# Patient Record
Sex: MALE | Race: OTHER
[De-identification: names, ages, dates, MRNs, and addresses within clinical notes are randomized per-mention and may not be internally consistent; named-entity substitution may affect disease eponyms.]

---

## 2020-10-18 ENCOUNTER — HOSPITAL ENCOUNTER (EMERGENCY)
Dept: HOSPITAL 72 - EMR | Age: 50
Discharge: HOME | End: 2020-10-18
Payer: SELF-PAY

## 2020-10-18 VITALS — BODY MASS INDEX: 2.82 KG/M2 | WEIGHT: 18 LBS | HEIGHT: 67 IN

## 2020-10-18 VITALS — DIASTOLIC BLOOD PRESSURE: 82 MMHG | SYSTOLIC BLOOD PRESSURE: 149 MMHG

## 2020-10-18 VITALS — DIASTOLIC BLOOD PRESSURE: 81 MMHG | SYSTOLIC BLOOD PRESSURE: 137 MMHG

## 2020-10-18 DIAGNOSIS — H66.014: Primary | ICD-10-CM

## 2020-10-18 DIAGNOSIS — R42: ICD-10-CM

## 2020-10-18 DIAGNOSIS — E11.9: ICD-10-CM

## 2020-10-18 DIAGNOSIS — I10: ICD-10-CM

## 2020-10-18 DIAGNOSIS — F17.200: ICD-10-CM

## 2020-10-18 PROCEDURE — 99282 EMERGENCY DEPT VISIT SF MDM: CPT

## 2020-10-18 NOTE — EMERGENCY ROOM REPORT
History of Present Illness


General


Chief Complaint:  Dizziness


Source:  Patient





Present Illness


HPI


The patient presents with right ear pain.  Today he also started having 

discharge that had a foul odor.  He denies fevers or chills.  In addition today 

he was complaining about dizziness while driving but was able to manage.  He 

felt mildly vertiginous without nausea.  The patient had some decrease in his 

hearing earlier today.  The pain is rated 7/10 and aching.  He denies sore 

throat or cough.





Several months ago and [place] he was [place] by  And told that he had

possibly mycologic process in his right ear.  No treatment was recommended at 

that time.  There was no drainage.





The patient is diabetic.  Accucheck 10 d ago = 120.





No sore throat, chest pain, palpitations, vomiting, diarrhea, dysuria, abdominal

pain, shortness of breath, joint pain, rashes, depression, anxiety, visual 

changes, dizziness, headache.


Allergies:  


Coded Allergies:  


     No Known Allergies (Unverified , 10/18/20)





COVID-19 Screening


Contact w/high risk pt:  No


Experienced COVID-19 symptoms?:  No


COVID-19 Testing performed PTA:  No





Patient History


Past Medical History:  see triage record


Social History:  Reports: smoking


Social History Narrative


Works at a desk job


Reviewed Nursing Documentation:  PMH: Agreed; PSxH: Agreed





Nursing Documentation-PMH


Hx Hypertension:  Yes


Hx Diabetes:  Yes





Review of Systems


All Other Systems:  negative except mentioned in HPI





Physical Exam





Vital Signs








  Date Time  Temp Pulse Resp B/P (MAP) Pulse Ox O2 Delivery O2 Flow Rate FiO2


 


10/18/20 19:58 98.2 86 48 152/82 (105) 98 Room Air  








Sp02 EP Interpretation:  reviewed, normal


General Appearance:  well appearing, no apparent distress, GCS 15


Head:  normocephalic


Eyes:  bilateral eye normal inspection, bilateral eye PERRL


ENT:  normal pharynx, other - Right TM with perforation and some drainage with 

inflammation without erythema of the tympanic membrane.  Canal normal and no 

pinna tenderness.  Left TM normal


Neck:  full range of motion, supple


Respiratory:  normal inspection


Cardiovascular #1:  regular rate, rhythm


Cardiovascular #2:  2+ radial (R)


Gastrointestinal:  normal inspection


Musculoskeletal:  gait/station normal


Neurologic:  alert, grossly normal


Psychiatric:  mood/affect normal


Skin:  no rash, warm/dry





Medical Decision Making


Diagnostic Impression:  


   Primary Impression:  


   Otitis media


   Qualified Codes:  H66.014 - Acute suppurative otitis media with spontaneous 

   rupture of ear drum, recurrent, right ear


   Additional Impression:  


   Vertigo


ER Course


Patient presents with discharge from right ear, decreased hearing without fever.

 Based on exam the patient has a perforated tympanic membrane that appears 

infected at this time.  He has increased risk factor of diabetes however it 

appears that his glycemic control is good.  Concern for viral versus bacterial 

versus micro logic process considered.  Based on exam otitis media of bacterial 

source with perforation is most likely.  Antibiotics indicated.  Also has 

vertiginous.  He states he is able to manage driving.  Ciprofloxacin indicated. 

Patient also given Tylenol for pain.





Discussed treatment plan with patient.  Discussed the need for outpatient 

evaluation by ear nose and throat specialty.





Patient improved and stable for outpatient observation and treatment.





Last Vital Signs








  Date Time  Temp Pulse Resp B/P (MAP) Pulse Ox O2 Delivery O2 Flow Rate FiO2


 


10/18/20 20:46 98.8       


 


10/18/20 20:46  76 16 137/81 100 Room Air  








Status:  improved


Disposition:  HOME, SELF-CARE


Condition:  Improved


Scripts


Meclizine Hcl* (MECLIZINE*) 25 Mg Tablet


25 MG ORAL THREE TIMES A DAY, #10 TAB


   Prov: Rajesh Rousseau MD         10/18/20 


Neomycin/Polymyxin B Sulf/Hc (NEOMYCIN-POLYMYXIN-HC EAR SUSP) 10 Ml Drops.susp


3 DROP OT TID for 7 Days, #10 ML


   Prov: Rajesh Rousseau MD         10/18/20 


Ciprofloxacin Hcl* (CIPROFLOXACIN HCL*) 500 Mg Tablet


500 MG ORAL Q12H, #14 TAB 0 Refills


   Prov: Rajesh Rousseau MD         10/18/20


Referrals:  


NOT CHOSEN IPA/MD,REFERRING (PCP)











Rajesh Rousseau MD                Oct 18, 2020 20:21

## 2020-10-18 NOTE — NUR
ER DISCHARGE NOTE:

Patient is cleared to be discharged per ERMD, pt is aox4, on room air, with 
stable vital signs. pt was given dc paperwork, work note, and paper 
prescriptions with instructions to f/u with PMD and ENT specialist as soon as 
possible. pt was able to verbalize understanding, pt id band removed. pt is 
able to ambulate with steady gait. pt took all belongings.

## 2020-10-18 NOTE — NUR
ED Nurse Note: pt c/o right ear pain onset today at 2pm with secretions, pt 
reports also feeling dizzy from the ear pain. Pt denies traumatic injury to 
ear. No nausea, vomiting, fever or chills. Pt AAOx4, vital signs stable as 
documented.

## 2020-11-10 ENCOUNTER — HOSPITAL ENCOUNTER (EMERGENCY)
Dept: HOSPITAL 72 - EMR | Age: 50
Discharge: HOME | End: 2020-11-10
Payer: SELF-PAY

## 2020-11-10 VITALS — BODY MASS INDEX: 27.28 KG/M2 | WEIGHT: 180 LBS | HEIGHT: 68 IN

## 2020-11-10 VITALS — SYSTOLIC BLOOD PRESSURE: 132 MMHG | DIASTOLIC BLOOD PRESSURE: 72 MMHG

## 2020-11-10 VITALS — DIASTOLIC BLOOD PRESSURE: 80 MMHG | SYSTOLIC BLOOD PRESSURE: 148 MMHG

## 2020-11-10 DIAGNOSIS — E11.65: Primary | ICD-10-CM

## 2020-11-10 DIAGNOSIS — I10: ICD-10-CM

## 2020-11-10 DIAGNOSIS — Z79.899: ICD-10-CM

## 2020-11-10 DIAGNOSIS — H92.01: ICD-10-CM

## 2020-11-10 DIAGNOSIS — Z79.4: ICD-10-CM

## 2020-11-10 LAB
ADD MANUAL DIFF: NO
ALBUMIN SERPL-MCNC: 3.6 G/DL (ref 3.4–5)
ALBUMIN/GLOB SERPL: 0.9 {RATIO} (ref 1–2.7)
ALP SERPL-CCNC: 142 U/L (ref 46–116)
ALT SERPL-CCNC: 42 U/L (ref 12–78)
ANION GAP SERPL CALC-SCNC: 11 MMOL/L (ref 5–15)
APPEARANCE UR: CLEAR
APTT PPP: (no result) S
AST SERPL-CCNC: 15 U/L (ref 15–37)
BASOPHILS NFR BLD AUTO: 1.5 % (ref 0–2)
BILIRUB SERPL-MCNC: 0.7 MG/DL (ref 0.2–1)
BUN SERPL-MCNC: 27 MG/DL (ref 7–18)
CALCIUM SERPL-MCNC: 9.1 MG/DL (ref 8.5–10.1)
CHLORIDE SERPL-SCNC: 96 MMOL/L (ref 98–107)
CO2 SERPL-SCNC: 24 MMOL/L (ref 21–32)
CREAT SERPL-MCNC: 1.8 MG/DL (ref 0.55–1.3)
EOSINOPHIL NFR BLD AUTO: 3 % (ref 0–3)
ERYTHROCYTE [DISTWIDTH] IN BLOOD BY AUTOMATED COUNT: 12.3 % (ref 11.6–14.8)
GLOBULIN SER-MCNC: 4.2 G/DL
GLUCOSE UR STRIP-MCNC: (no result) MG/DL
HCT VFR BLD CALC: 46.6 % (ref 42–52)
HGB BLD-MCNC: 15.3 G/DL (ref 14.2–18)
KETONES UR QL STRIP: (no result)
LEUKOCYTE ESTERASE UR QL STRIP: NEGATIVE
LYMPHOCYTES NFR BLD AUTO: 28.4 % (ref 20–45)
MCV RBC AUTO: 89 FL (ref 80–99)
MONOCYTES NFR BLD AUTO: 6.9 % (ref 1–10)
NEUTROPHILS NFR BLD AUTO: 60.3 % (ref 45–75)
NITRITE UR QL STRIP: NEGATIVE
PH UR STRIP: 5 [PH] (ref 4.5–8)
PLATELET # BLD: 285 K/UL (ref 150–450)
POTASSIUM SERPL-SCNC: 4.2 MMOL/L (ref 3.5–5.1)
PROT UR QL STRIP: (no result)
RBC # BLD AUTO: 5.27 M/UL (ref 4.7–6.1)
SODIUM SERPL-SCNC: 131 MMOL/L (ref 136–145)
SP GR UR STRIP: 1.01 (ref 1–1.03)
UROBILINOGEN UR-MCNC: NORMAL MG/DL (ref 0–1)
WBC # BLD AUTO: 9.7 K/UL (ref 4.8–10.8)

## 2020-11-10 PROCEDURE — 96361 HYDRATE IV INFUSION ADD-ON: CPT

## 2020-11-10 PROCEDURE — 96374 THER/PROPH/DIAG INJ IV PUSH: CPT

## 2020-11-10 PROCEDURE — 36415 COLL VENOUS BLD VENIPUNCTURE: CPT

## 2020-11-10 PROCEDURE — 84484 ASSAY OF TROPONIN QUANT: CPT

## 2020-11-10 PROCEDURE — 85651 RBC SED RATE NONAUTOMATED: CPT

## 2020-11-10 PROCEDURE — 82009 KETONE BODYS QUAL: CPT

## 2020-11-10 PROCEDURE — 80053 COMPREHEN METABOLIC PANEL: CPT

## 2020-11-10 PROCEDURE — 86140 C-REACTIVE PROTEIN: CPT

## 2020-11-10 PROCEDURE — 70450 CT HEAD/BRAIN W/O DYE: CPT

## 2020-11-10 PROCEDURE — 85025 COMPLETE CBC W/AUTO DIFF WBC: CPT

## 2020-11-10 PROCEDURE — 99284 EMERGENCY DEPT VISIT MOD MDM: CPT

## 2020-11-10 PROCEDURE — 83735 ASSAY OF MAGNESIUM: CPT

## 2020-11-10 PROCEDURE — 93005 ELECTROCARDIOGRAM TRACING: CPT

## 2020-11-10 PROCEDURE — 81003 URINALYSIS AUTO W/O SCOPE: CPT

## 2020-11-10 NOTE — EMERGENCY ROOM REPORT
History of Present Illness


General


Chief Complaint:  Earache





Present Illness


HPI


49-year-old male with history of diabetes currently insulin-dependent and last 

took NovoLog 1 day ago which she is to take 3 times a day here complaining of 

right ear pain worse in the past 3 days.  Patient was seen in [facility] in 

October for the same issue regarding his ear and was diagnosed with otitis media

and externa was given eardrops as below as antibiotics.  Patient never follow-up

with ENT.  Patient denies any dizziness and vertigo at this time.  Patient 

reports that he believes there is an abscess inside his head as he reports that"

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  Patient denies fever and chills, chest pain, 

shortness of reports that he is a prescription for NovoLog to  from 

pharmacy tomorrow.  Patient does not want prescription for NovoLog and reports 

that it is very expensive and there is only 1 pharmacy for (which is only open 

tomorrow.  Denies tinnitus, hearing loss.


 (Venkata Merritt)


Allergies:  


Coded Allergies:  


     No Known Allergies (Unverified , 10/18/20)





COVID-19 Screening


Contact w/high risk pt:  No


Experienced COVID-19 symptoms?:  No


COVID-19 Testing performed PTA:  No


 (Venkata Merritt)





Patient History


Past Medical History:  see triage record


Past Surgical History:  none


Pertinent Family History:  none


Immunizations:  UTD


Reviewed Nursing Documentation:  PMH: Agreed; PSxH: Agreed 

(Venkata Merritt)





Nursing Documentation-PMH


Hx Hypertension:  Yes


Hx Diabetes:  Yes


 (Venkata Merritt)





Review of Systems


All Other Systems:  negative except mentioned in HPI


 (Venkata Merritt)





Physical Exam





Vital Signs








  Date Time  Temp Pulse Resp B/P (MAP) Pulse Ox O2 Delivery O2 Flow Rate FiO2


 


11/10/20 19:23 98.4 91 18 148/80 (102)  Room Air  








Sp02 EP Interpretation:  reviewed, normal


General Appearance:  no apparent distress, alert, GCS 15, non-toxic


Head:  normocephalic, atraumatic


Eyes:  bilateral eye normal inspection, bilateral eye PERRL


ENT:  hearing grossly normal, normal pharynx, no angioedema, normal voice, other

- Pus in right ear canal and TM bulging


Neck:  full range of motion, supple/symm/no masses


Respiratory:  chest non-tender, lungs clear, normal breath sounds, speaking full

sentences


Cardiovascular #1:  regular rate, rhythm, no edema


Gastrointestinal:  normal bowel sounds, non tender, soft, non-distended, no 

guarding, no rebound


Musculoskeletal:  back normal


Neurologic:  alert, motor strength/tone normal, oriented x3, sensory intact, 

responsive, speech normal


Psychiatric:  judgement/insight normal, memory normal, mood/affect normal, no 

suicidal/homicidal ideation


Skin:  no rash


Lymphatic:  no adenopathy


 (Venkata Merritt)





Medical Decision Making


PA Attestation


 ALL Diagnosis and treatment plan reviewed and discussed with my supervising 

physician Dr. Alicia


 (Venkata Merritt)


Diagnostic Impression:  


   Primary Impression:  


   Hyperglycemia


   Additional Impression:  


   Otalgia


ER Course


49-year-old male with history of diabetes currently insulin-dependent and last 

took NovoLog 1 day ago which she is to take 3 times a day here complaining of 

right ear pain worse in the past 3 days.  Patient was seen in [place] [facility] in 

October for the same issue regarding his ear and was diagnosed with otitis media

and externa was given eardrops as below as antibiotics.  Patient never follow-up

with ENT.  Patient denies any dizziness and vertigo at this time.  Patient 

reports that he believes there is an abscess inside his head as he reports that"

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  Patient denies fever and chills, chest pain, 

shortness of reports that he is a prescription for NovoLog to  from 

pharmacy tomorrow.  Patient does not want prescription for NovoLog and reports 

that it is very expensive and there is only 1 pharmacy for (which is only open 

tomorrow.  Denies tinnitus, hearing loss.





Ddx considered but are not limited to : Mastoiditis, periauricular abscess, 

sinus abscess, otitis media, otitis externa,





Vital signs: are WNL, pt. is afebrile





H&PE are most consistent with: Hyperglycemia, otalgia





ORDERS: CBC, CMP, UA, Accu-Chek,head CT non contrast


ED INTERVENTIONS: NS bolus








I signed out the patient to Dr. Alicia at 8 PM


 (Venkata Merritt)


ER Course


EKG: NSR, no ischemia, intervals WNL. No ectopy.  Rate 95 bpm


Rhythm strip: patient monitored for arrhythmias - no malignant dysrhythmias, run

s of PVCs, nor pauses noted








Procedure: CT Head no Contrast





EXAM:


  CT Head Without Intravenous Contrast


 


CLINICAL HISTORY:


  PAIN


 


TECHNIQUE:


  Axial computed tomography images of the head/brain without intravenous 


contrast.  One or more of the following dose reduction techniques were 


used: automated exposure control, adjustment of the mA and/or kV 


according to patient size, use of iterative reconstruction technique.


 


COMPARISON:


  No relevant prior studies available.


 


FINDINGS:


  Brain:  Patchy low attenuation in the periventricular white matter 


consistent with chronic small vessel disease.  There are bilateral 


periventricular chronic infarcts near the vertex.  Negative for mass-


effect or intracranial hemorrhage.  The degree of generalized cerebral 


atrophy is greater than expected for patient's age.


  Ventricles:  Unremarkable.  No ventriculomegaly.


  Bones/joints:  Unremarkable.  No acute fracture.


  Soft tissues:  Unremarkable.


  Sinuses:  Unremarkable as visualized.  No acute sinusitis.


  Mastoid air cells:  Diffuse mastoid effusions on the right.


  Other findings:  Motion degraded study.


 


IMPRESSION:     


  No evidence of acute intracranial abnormality.


 





49-year-old male here with right ear pain.  Patient appear to have otitis 

externa on physical examination.  No mastoid pain.  He was afebrile and had 

otherwise normal vital signs.  CT head was unremarkable.  Patient was given a 

prescription for Ciprodex drops.  Was told that he needs to follow-up with ENT. 

Told to come back to the emergency department with with worsening headache, 

vision changes, fevers, chills, worsening pain.  He expressed understanding and 

was discharged.


 (Salomon Alicia M.D.)





Last Vital Signs








  Date Time  Temp Pulse Resp B/P (MAP) Pulse Ox O2 Delivery O2 Flow Rate FiO2


 


11/10/20 19:23 98.4 91 18 148/80 (102)  Room Air  








 (Venkata Merritt)


Scripts


Ciprofloxacin Hcl/Dexameth (CIPRODEX OTIC SUSPENSION) 7.5 Ml Drops.susp


4 DROP RIGHT EAR TWICE A DAY for 7 Days, AMP


   Prov: Salomon Alicia M.D.         11/10/20











Venkata Merritt      Nov 10, 2020 19:54


Salomon Alicia M.D.                Nov 10, 2020 20:08

## 2020-11-10 NOTE — DIAGNOSTIC IMAGING REPORT
EXAM:

  CT Head Without Intravenous Contrast

 

CLINICAL HISTORY:

  PAIN

 

TECHNIQUE:

  Axial computed tomography images of the head/brain without intravenous 

contrast.  One or more of the following dose reduction techniques were 

used: automated exposure control, adjustment of the mA and/or kV 

according to patient size, use of iterative reconstruction technique.

 

COMPARISON:

  No relevant prior studies available.

 

FINDINGS:

  Brain:  Patchy low attenuation in the periventricular white matter 

consistent with chronic small vessel disease.  There are bilateral 

periventricular chronic infarcts near the vertex.  Negative for mass-

effect or intracranial hemorrhage.  The degree of generalized cerebral 

atrophy is greater than expected for patient's age.

  Ventricles:  Unremarkable.  No ventriculomegaly.

  Bones/joints:  Unremarkable.  No acute fracture.

  Soft tissues:  Unremarkable.

  Sinuses:  Unremarkable as visualized.  No acute sinusitis.

  Mastoid air cells:  Diffuse mastoid effusions on the right.

  Other findings:  Motion degraded study.

 

IMPRESSION:     

  No evidence of acute intracranial abnormality.

## 2020-11-11 NOTE — CARDIOLOGY REPORT
--------------- APPROVED REPORT --------------





EKG Measurement

Heart [id number]

MI [id number]

[id number]

[id number]

[id number]



<Conclusion>

Normal sinus rhythm

Normal ECG